# Patient Record
Sex: FEMALE | URBAN - METROPOLITAN AREA
[De-identification: names, ages, dates, MRNs, and addresses within clinical notes are randomized per-mention and may not be internally consistent; named-entity substitution may affect disease eponyms.]

---

## 2023-04-06 ENCOUNTER — OFFICE VISIT (OUTPATIENT)
Dept: URBAN - METROPOLITAN AREA CLINIC 71 | Facility: CLINIC | Age: 23
End: 2023-04-06
Payer: COMMERCIAL

## 2023-04-06 DIAGNOSIS — R51.9 HEADACHE, UNSPECIFIED: Primary | ICD-10-CM

## 2023-04-06 DIAGNOSIS — H52.13 MYOPIA, BILATERAL: ICD-10-CM

## 2023-04-06 DIAGNOSIS — H10.45 OTHER CHRONIC ALLERGIC CONJUNCTIVITIS: ICD-10-CM

## 2023-04-06 PROCEDURE — 92004 COMPRE OPH EXAM NEW PT 1/>: CPT | Performed by: OPTOMETRIST

## 2023-04-06 ASSESSMENT — KERATOMETRY
OS: 42.25
OD: 41.50

## 2023-04-06 ASSESSMENT — VISUAL ACUITY
OD: 20/20
OS: 20/20

## 2023-04-06 ASSESSMENT — INTRAOCULAR PRESSURE
OD: 10
OS: 10

## 2023-04-06 NOTE — IMPRESSION/PLAN
Impression: Other chronic allergic conjunctivitis: H10.45. Pt reports itching, rubs eyes often. Plan: Discussed. Advise the use of Ketotifen/Olopatadine/Alcaftadine QD-BID OU PRN. Allergy drop handout given to pt today. Call if symptoms do not resolve or if worsens. noted

## 2023-04-06 NOTE — IMPRESSION/PLAN
Impression: Headache, unspecified: R51.9. Occasional headache across the front of her forehead. Plan: Discussed with pt. Continue to monitor.

## 2023-04-06 NOTE — IMPRESSION/PLAN
Impression: Myopia, bilateral: H52.13. Pt also assessed by student doctor, Rl today. Plan: A glasses prescription has been discussed and generated. Patient to call with any concerns.